# Patient Record
Sex: FEMALE | Race: WHITE | Employment: OTHER | URBAN - METROPOLITAN AREA
[De-identification: names, ages, dates, MRNs, and addresses within clinical notes are randomized per-mention and may not be internally consistent; named-entity substitution may affect disease eponyms.]

---

## 2017-10-19 ENCOUNTER — HOSPITAL ENCOUNTER (OUTPATIENT)
Dept: ULTRASOUND IMAGING | Age: 72
Discharge: HOME | End: 2017-10-19
Payer: MEDICARE

## 2017-10-19 DIAGNOSIS — Z12.39 BREAST CANCER SCREENING: ICD-10-CM

## 2017-10-19 DIAGNOSIS — Z98.82 HX OF BILATERAL BREAST IMPLANTS: ICD-10-CM

## 2017-10-19 DIAGNOSIS — R92.8 ABNORMAL MAMMOGRAM: ICD-10-CM

## 2017-10-19 PROCEDURE — 76641 ULTRASOUND BREAST COMPLETE: CPT

## 2018-08-24 ENCOUNTER — OFFICE VISIT (OUTPATIENT)
Dept: OBGYN CLINIC | Age: 73
End: 2018-08-24
Payer: MEDICARE

## 2018-08-24 VITALS
WEIGHT: 126 LBS | HEIGHT: 64 IN | BODY MASS INDEX: 21.51 KG/M2 | SYSTOLIC BLOOD PRESSURE: 124 MMHG | DIASTOLIC BLOOD PRESSURE: 62 MMHG

## 2018-08-24 DIAGNOSIS — Z01.419 WELL FEMALE EXAM WITH ROUTINE GYNECOLOGICAL EXAM: Primary | ICD-10-CM

## 2018-08-24 DIAGNOSIS — Z12.31 SCREENING MAMMOGRAM, ENCOUNTER FOR: ICD-10-CM

## 2018-08-24 DIAGNOSIS — Z11.51 SPECIAL SCREENING EXAMINATION FOR HUMAN PAPILLOMAVIRUS (HPV): ICD-10-CM

## 2018-08-24 DIAGNOSIS — Z78.0 POSTMENOPAUSAL: ICD-10-CM

## 2018-08-24 PROCEDURE — G0101 CA SCREEN;PELVIC/BREAST EXAM: HCPCS | Performed by: OBSTETRICS & GYNECOLOGY

## 2018-08-24 ASSESSMENT — ENCOUNTER SYMPTOMS
ABDOMINAL DISTENTION: 0
BLOOD IN STOOL: 0
ANAL BLEEDING: 0
DIARRHEA: 0
NAUSEA: 0
CONSTIPATION: 0
EYES NEGATIVE: 1
ALLERGIC/IMMUNOLOGIC NEGATIVE: 1
RECTAL PAIN: 0
VOMITING: 0
RESPIRATORY NEGATIVE: 1
ABDOMINAL PAIN: 0

## 2018-08-24 ASSESSMENT — PATIENT HEALTH QUESTIONNAIRE - PHQ9
SUM OF ALL RESPONSES TO PHQ QUESTIONS 1-9: 0
SUM OF ALL RESPONSES TO PHQ QUESTIONS 1-9: 0
2. FEELING DOWN, DEPRESSED OR HOPELESS: 0
1. LITTLE INTEREST OR PLEASURE IN DOING THINGS: 0
SUM OF ALL RESPONSES TO PHQ9 QUESTIONS 1 & 2: 0

## 2018-08-24 NOTE — PROGRESS NOTES
Subjective:      Patient ID: Gabriela Issa is a 68 y.o. female    Annual exam.  No PMB. No GYN complaints. Pap done per request.   Anel Thor breast care through breast surgeon at [facility]. Currently only has US secondary to small leak left implant. Discussed and patient with see if she can get MRI pre-authorized through breast surgeon's office for better assessment of breasts. Monthly SBE encouraged. Dexa scan ordered per protocol. Screening colonoscopy recommended per routine. F/U annual exam or prn. Vitals:  /62   Ht 5' 4\" (1.626 m)   Wt 126 lb (57.2 kg)   BMI 21.63 kg/m²   Past Medical History:   Diagnosis Date    Arthritis     Heart murmur      Past Surgical History:   Procedure Laterality Date    BREAST SURGERY      Implants    COLONOSCOPY N/A 10/19/2016    COLONOSCOPY performed by Neal Marinelli MD at [place] [id number] 10/19/2016    EGD ESOPHAGOGASTRODUODENOSCOPY performed by Neal Marinelli MD at [facility]     Allergies:  Sulfa antibiotics  Current Outpatient Prescriptions   Medication Sig Dispense Refill    vitamin D 1000 UNITS CAPS Take 2,000 Units by mouth       No current facility-administered medications for this visit. Social History     Social History    Marital status:      Spouse name: N/A    Number of children: N/A    Years of education: N/A     Occupational History    Not on file.      Social History Main Topics    Smoking status: Former Smoker    Smokeless tobacco: Never Used    Alcohol use No    Drug use: No    Sexual activity: Not Currently     Partners: Male     Other Topics Concern    Not on file     Social History Narrative    No narrative on file     Family History   Problem Relation Age of Onset    Heart Disease Mother     Colon Cancer Father     No Known Problems Sister     No Known Problems Sister     No Known Problems Sister     Lung Cancer Sister     No Known Problems Paternal

## 2018-08-24 NOTE — PROGRESS NOTES
The patient was asked if she would like a chaperone present for her intimate exam. She  declines the chaperone.  Mc

## 2018-08-28 ENCOUNTER — TELEPHONE (OUTPATIENT)
Dept: OBGYN CLINIC | Age: 73
End: 2018-08-28

## 2018-08-31 ENCOUNTER — HOSPITAL ENCOUNTER (OUTPATIENT)
Dept: ULTRASOUND IMAGING | Age: 73
Discharge: HOME | End: 2018-09-02
Payer: MEDICARE

## 2018-08-31 DIAGNOSIS — T85.43XD: ICD-10-CM

## 2018-08-31 PROCEDURE — 76641 ULTRASOUND BREAST COMPLETE: CPT

## 2018-09-05 DIAGNOSIS — Z11.51 SPECIAL SCREENING EXAMINATION FOR HUMAN PAPILLOMAVIRUS (HPV): ICD-10-CM

## 2018-09-05 DIAGNOSIS — Z01.419 WELL FEMALE EXAM WITH ROUTINE GYNECOLOGICAL EXAM: ICD-10-CM

## 2020-08-20 ENCOUNTER — HOSPITAL ENCOUNTER (OUTPATIENT)
Dept: ULTRASOUND IMAGING | Age: 75
Discharge: HOME | End: 2020-08-22
Payer: MEDICARE

## 2020-08-20 PROCEDURE — 76641 ULTRASOUND BREAST COMPLETE: CPT

## 2021-10-22 ENCOUNTER — HOSPITAL ENCOUNTER (OUTPATIENT)
Dept: ULTRASOUND IMAGING | Age: 76
Discharge: HOME | End: 2021-10-24
Payer: MEDICARE

## 2021-10-22 DIAGNOSIS — R92.8 ABNORMAL MAMMOGRAM: ICD-10-CM

## 2021-10-22 PROCEDURE — 76641 ULTRASOUND BREAST COMPLETE: CPT

## 2023-05-12 ENCOUNTER — TRANSCRIBE ORDERS (OUTPATIENT)
Dept: ADMINISTRATIVE | Age: 78
End: 2023-05-12

## 2023-05-12 DIAGNOSIS — R92.8 ABNORMAL MAMMOGRAM: Primary | ICD-10-CM

## 2023-05-12 DIAGNOSIS — T85.43XD: Primary | ICD-10-CM

## 2023-05-23 ENCOUNTER — HOSPITAL ENCOUNTER (OUTPATIENT)
Dept: ULTRASOUND IMAGING | Age: 78
Discharge: HOME | End: 2023-05-25
Payer: MEDICARE

## 2023-05-23 DIAGNOSIS — R92.8 ABNORMAL MAMMOGRAM: ICD-10-CM

## 2023-05-23 PROCEDURE — 76641 ULTRASOUND BREAST COMPLETE: CPT

## 2023-11-03 PROBLEM — I35.1 MILD AORTIC REGURGITATION: Status: ACTIVE | Noted: 2019-06-12

## 2023-11-03 PROBLEM — K21.9 GASTROESOPHAGEAL REFLUX DISEASE: Status: ACTIVE | Noted: 2021-05-13

## 2023-11-03 RX ORDER — VIT C/E/ZN/COPPR/LUTEIN/ZEAXAN 250MG-90MG
2000 CAPSULE ORAL
COMMUNITY
Start: 2016-09-09

## 2023-11-03 RX ORDER — HYDROXYZINE PAMOATE 25 MG/1
25 CAPSULE ORAL EVERY 8 HOURS PRN
COMMUNITY
Start: 2022-05-09 | End: 2023-11-10

## 2023-11-03 RX ORDER — SODIUM CHLORIDE 0.9 % (FLUSH) 0.9 %
10 SYRINGE (ML) INJECTION
COMMUNITY
Start: 2023-04-18 | End: 2023-11-10

## 2023-11-03 RX ORDER — DICLOFENAC SODIUM 10 MG/G
2 GEL TOPICAL 4 TIMES DAILY
COMMUNITY
Start: 2022-05-09 | End: 2023-11-10

## 2023-11-03 RX ORDER — FLUTICASONE PROPIONATE 50 MCG
2 SPRAY, SUSPENSION (ML) NASAL
COMMUNITY
Start: 2021-05-13 | End: 2023-11-10

## 2023-11-03 RX ORDER — AMLODIPINE BESYLATE 2.5 MG/1
2.5 TABLET ORAL
COMMUNITY
Start: 2023-10-17 | End: 2024-10-16

## 2023-11-03 RX ORDER — MULTIVITAMIN
TABLET ORAL
COMMUNITY
Start: 2004-02-26

## 2023-11-03 RX ORDER — PREDNISONE 10 MG/1
TABLET ORAL
COMMUNITY
Start: 2022-10-14 | End: 2023-11-10

## 2023-11-10 ENCOUNTER — OFFICE VISIT (OUTPATIENT)
Dept: CARDIOLOGY | Facility: CLINIC | Age: 78
End: 2023-11-10
Payer: MEDICARE

## 2023-11-10 VITALS — HEART RATE: 78 BPM | SYSTOLIC BLOOD PRESSURE: 132 MMHG | DIASTOLIC BLOOD PRESSURE: 69 MMHG | WEIGHT: 124 LBS

## 2023-11-10 DIAGNOSIS — I35.1 MILD AORTIC REGURGITATION: ICD-10-CM

## 2023-11-10 DIAGNOSIS — I35.1 MODERATE AORTIC REGURGITATION: ICD-10-CM

## 2023-11-10 DIAGNOSIS — I35.9 AORTIC VALVE DISORDER: Primary | ICD-10-CM

## 2023-11-10 PROBLEM — I10 PRIMARY HYPERTENSION: Status: ACTIVE | Noted: 2023-11-10

## 2023-11-10 PROCEDURE — 99203 OFFICE O/P NEW LOW 30 MIN: CPT | Performed by: INTERNAL MEDICINE

## 2023-11-10 PROCEDURE — 93000 ELECTROCARDIOGRAM COMPLETE: CPT | Performed by: INTERNAL MEDICINE

## 2023-11-10 PROCEDURE — 3078F DIAST BP <80 MM HG: CPT | Performed by: INTERNAL MEDICINE

## 2023-11-10 PROCEDURE — 3075F SYST BP GE 130 - 139MM HG: CPT | Performed by: INTERNAL MEDICINE

## 2023-11-10 PROCEDURE — 1159F MED LIST DOCD IN RCRD: CPT | Performed by: INTERNAL MEDICINE

## 2023-11-10 RX ORDER — AMLODIPINE BESYLATE 2.5 MG/1
2.5 TABLET ORAL
Qty: 90 TABLET | Refills: 3 | Status: CANCELLED | OUTPATIENT
Start: 2023-11-10 | End: 2024-11-09

## 2023-11-10 ASSESSMENT — ENCOUNTER SYMPTOMS
DEPRESSION: 0
LOSS OF SENSATION IN FEET: 0
OCCASIONAL FEELINGS OF UNSTEADINESS: 0

## 2023-11-10 NOTE — PROGRESS NOTES
CARDIOLOGY NEW PATIENT OFFICE VISIT    Patient:  Cherry Avendaño  YOB: 1945  MRN: [id number]       History of Present Illness:     Cherry Avendaño is a 78 y.o. female who is being seen today at the request of Dr. Roque for evaluation of aortic regurgitation. She does not have any history of atherosclerotic heart disease.  She has a history of recently diagnosed primary hypertension for which she takes low-dose amlodipine.  No diabetes mellitus, hyperlipidemia, or tobacco use.  She has a history of stage IIIb chronic kidney disease.  She is followed on a regular basis by Dr. Ramesh.  She was noted on echocardiogram in 2006 to have normal LV systolic function and mild (1+) aortic regurgitation.  She has had a few echocardiograms over the past several years that showed similar findings.  Most recent echocardiogram on June 30, 2023 showed an estimated LV ejection fraction 65% with normal RV size and function.  There was 2+ aortic regurgitation and 1+ mitral regurgitation.      She generally has been feeling well.  She is without any specific symptoms.  She remains very active.  She previously worked as a manager for [employer].  She denies any chest pain or shortness breath.  She denies any orthopnea, PND, or increasing peripheral edema.  She denies any palpitations, lightheadedness, near-syncope, or syncope.  She denies any fever, chills, or cough.  She denies any nausea, vomiting, or diaphoresis.  She denies any hemoptysis, hematemesis, melena, or hematochezia.  Lab studies dated October 10, 2023 showed a hemoglobin 10.6 and hematocrit 34.0.  BUN 22 and creatinine 1.54.  Sodium 133 and potassium 4.7.  Cholesterol 186 with triglycerides 106, HDL 43, and .  Liver function studies were normal.  Vitamin D level 34.  Her EKG in the office today shows normal sinus rhythm and is a normal tracing.      Allergies:     Allergies   Allergen Reactions    Sulfa (Sulfonamide Antibiotics) Rash         Outpatient Medications:     Current Outpatient Medications   Medication Instructions    amLODIPine (NORVASC) 2.5 mg, oral, Daily RT    cholecalciferol (VITAMIN D-3) 2,000 Units, oral, Daily RT    multivitamin tablet Take one(1) tablet daily.    vit C-E-cupric-zinc-lutein (Preservision) 226-90-0.8-5 mg capsule capsule 1 capsule, oral, Daily RT        Past Medical History:     No past medical history on file.      Social History:     Social History     Tobacco Use    Smoking status: Never     Passive exposure: Never    Smokeless tobacco: Never       Family History:   No family history on file.    Review of Systems:     12 point review of systems completed and is negative other than that detailed above.       Objective:     Vitals:    11/10/23 1433   BP: 136/70   Pulse:        Wt Readings from Last 4 Encounters:   11/10/23 56.2 kg (124 lb)       Physical Examination:   GENERAL:  Well developed, well nourished, in no acute distress.  CHEST:  Symmetric and nontender.  NEURO/PSYCH:  Alert and oriented times three with approppriate behavior and responses.  NECK:  Supple, no JVD, no bruit.  LUNGS:  Clear to auscultation bilaterally, normal respiratory effort.  HEART:  Rate and rhythm regular with I/VI diastolic murmur LUSB, no gallop appreciated.        There are no rubs, clicks or heaves.  EXTREMITIES:  Warm with good color, no clubbing or cyanosis.  There is no edema noted.  PERIPHERAL VASCULAR:  Pulses present and equally palpable; 2+ throughout.      Lab:     HgBA1c:    Lab Results   Component Value Date    HGBA1C 5.7 (H) 06/30/2023       Problem List:     Patient Active Problem List   Diagnosis    Aortic valve disorder    Chronic depressive personality disorder    Gastroesophageal reflux disease    Mild aortic regurgitation    Other hyperlipidemia    Other specified anemias       Assessment:     Moderate aortic regurgitation.    Primary hypertension.     Stage IIIb chronic kidney disease.        Plan:     She will  continue on amlodipine 2.5 mg daily.    She will be scheduled for a follow-up echocardiogram in 1 year.    No other new cardiac recommendations at this time.

## 2024-06-04 ENCOUNTER — TRANSCRIBE ORDERS (OUTPATIENT)
Dept: GENERAL RADIOLOGY | Age: 79
End: 2024-06-04

## 2024-06-04 DIAGNOSIS — Z12.39 ENCOUNTER FOR BREAST CANCER SCREENING USING NON-MAMMOGRAM MODALITY: Primary | ICD-10-CM

## 2024-06-27 ENCOUNTER — APPOINTMENT (OUTPATIENT)
Dept: ULTRASOUND IMAGING | Age: 79
End: 2024-06-27
Payer: MEDICARE

## 2024-06-27 ENCOUNTER — HOSPITAL ENCOUNTER (OUTPATIENT)
Dept: ULTRASOUND IMAGING | Age: 79
Discharge: HOME | End: 2024-06-29
Payer: MEDICARE

## 2024-06-27 DIAGNOSIS — Z12.39 ENCOUNTER FOR BREAST CANCER SCREENING USING NON-MAMMOGRAM MODALITY: ICD-10-CM

## 2024-06-27 PROCEDURE — 76641 ULTRASOUND BREAST COMPLETE: CPT

## 2024-11-04 ENCOUNTER — ANCILLARY PROCEDURE (OUTPATIENT)
Dept: CARDIOLOGY | Facility: HOSPITAL | Age: 79
End: 2024-11-04
Payer: MEDICARE

## 2024-11-04 DIAGNOSIS — I35.1 MILD AORTIC REGURGITATION: ICD-10-CM

## 2024-11-04 PROCEDURE — 93306 TTE W/DOPPLER COMPLETE: CPT

## 2024-11-04 PROCEDURE — 93306 TTE W/DOPPLER COMPLETE: CPT | Performed by: INTERNAL MEDICINE

## 2024-11-05 ENCOUNTER — TELEPHONE (OUTPATIENT)
Dept: CARDIOLOGY | Facility: CLINIC | Age: 79
End: 2024-11-05
Payer: MEDICARE

## 2024-11-05 LAB
AORTIC VALVE MEAN GRADIENT: 6 MMHG
AORTIC VALVE PEAK VELOCITY: 1.57 M/S
AV PEAK GRADIENT: 10 MMHG
AVA (PEAK VEL): 2.44 CM2
AVA (VTI): 2.18 CM2
EJECTION FRACTION APICAL 4 CHAMBER: 50
EJECTION FRACTION: 60 %
LEFT VENTRICLE INTERNAL DIMENSION DIASTOLE: 3.82 CM (ref 3.5–6)
LEFT VENTRICULAR OUTFLOW TRACT DIAMETER: 1.9 CM
LV EJECTION FRACTION BIPLANE: 55 %
MITRAL VALVE E/A RATIO: 0.98
MITRAL VALVE E/E' RATIO: 10.8
RIGHT VENTRICLE PEAK SYSTOLIC PRESSURE: 34.4 MMHG

## 2024-11-05 NOTE — TELEPHONE ENCOUNTER
----- Message from Riley Watkins sent at 11/5/2024 11:48 AM EST -----  Echocardiogram reviewed and looks fine.  Similar to previous results.  Can discuss in detail at her upcoming office visit.  Thanks.

## 2024-11-14 ENCOUNTER — APPOINTMENT (OUTPATIENT)
Dept: CARDIOLOGY | Facility: CLINIC | Age: 79
End: 2024-11-14
Payer: MEDICARE

## 2024-11-15 ENCOUNTER — APPOINTMENT (OUTPATIENT)
Dept: CARDIOLOGY | Facility: CLINIC | Age: 79
End: 2024-11-15
Payer: MEDICARE

## 2024-11-19 ENCOUNTER — APPOINTMENT (OUTPATIENT)
Dept: CARDIOLOGY | Facility: CLINIC | Age: 79
End: 2024-11-19
Payer: MEDICARE

## 2024-11-29 ENCOUNTER — OFFICE VISIT (OUTPATIENT)
Dept: URGENT CARE | Age: 79
End: 2024-11-29
Payer: MEDICARE

## 2024-11-29 VITALS
OXYGEN SATURATION: 98 % | TEMPERATURE: 98.7 F | BODY MASS INDEX: 21 KG/M2 | DIASTOLIC BLOOD PRESSURE: 86 MMHG | HEART RATE: 92 BPM | RESPIRATION RATE: 20 BRPM | WEIGHT: 123 LBS | HEIGHT: 64 IN | SYSTOLIC BLOOD PRESSURE: 143 MMHG

## 2024-11-29 DIAGNOSIS — R05.9 COUGH, UNSPECIFIED TYPE: ICD-10-CM

## 2024-11-29 DIAGNOSIS — J02.9 SORE THROAT: ICD-10-CM

## 2024-11-29 DIAGNOSIS — J06.9 VIRAL UPPER RESPIRATORY ILLNESS: Primary | ICD-10-CM

## 2024-11-29 LAB
POC RAPID INFLUENZA A: NEGATIVE
POC RAPID INFLUENZA B: NEGATIVE
POC RAPID STREP: NEGATIVE
POC SARS-COV-2 AG BINAX: NORMAL

## 2024-11-29 RX ORDER — BENZONATATE 200 MG/1
200 CAPSULE ORAL 3 TIMES DAILY PRN
Qty: 21 CAPSULE | Refills: 0 | Status: SHIPPED | OUTPATIENT
Start: 2024-11-29 | End: 2024-12-06

## 2024-11-29 NOTE — PROGRESS NOTES
"Subjective   Patient ID: Cherry Avendaño is a 79 y.o. female. They present today with a chief complaint of Sore Throat (Painful swallow), Cough (Yellow sputum ), Fatigue, and Request For Covid Testing.    History of Present Illness    79-year-old patient presents to clinic with complaints of productive cough with associated fatigue, rhinorrhea, sore throat ongoing for the past 2 days.  Reports has tried over-the-counter treatments without relief.  Reports no sick contacts. Denies shortness of breath, chest pain, dizziness, sinus pain, fevers, chills, body aches, nausea, vomiting, abdominal pain, diarrhea.       Past Medical History  Allergies as of 11/29/2024 - Reviewed 11/10/2023   Allergen Reaction Noted    Sulfa (sulfonamide antibiotics) Rash and Unknown 03/10/2014       (Not in a hospital admission)       No past medical history on file.    No past surgical history on file.     reports that she has never smoked. She has never been exposed to tobacco smoke. She has never used smokeless tobacco.    Review of Systems    ROS negative with the exception as noted on HPI                               Objective    Vitals:    11/29/24 1132   BP: 143/86   Pulse: 92   Resp: 20   Temp: 37.1 °C (98.7 °F)   SpO2: 98%   Weight: 55.8 kg (123 lb)   Height: 1.626 m (5' 4\")     No LMP recorded.    Physical Exam  Constitutional:       Appearance: Normal appearance.   HENT:      Head: Normocephalic and atraumatic.      Right Ear: Tympanic membrane, ear canal and external ear normal.      Left Ear: Tympanic membrane, ear canal and external ear normal.      Nose: Mucosal edema and rhinorrhea present. Rhinorrhea is clear.      Right Sinus: No maxillary sinus tenderness or frontal sinus tenderness.      Left Sinus: No maxillary sinus tenderness or frontal sinus tenderness.      Mouth/Throat:      Lips: Pink.      Mouth: Mucous membranes are moist. No oral lesions.      Dentition: Normal dentition. No gingival swelling.      Tongue: No " lesions. Tongue does not deviate from midline.      Palate: No mass and lesions.      Pharynx: Posterior oropharyngeal erythema and postnasal drip present. No pharyngeal swelling or uvula swelling.   Cardiovascular:      Rate and Rhythm: Normal rate and regular rhythm.      Heart sounds: No murmur heard.  Pulmonary:      Effort: Pulmonary effort is normal. No respiratory distress.      Breath sounds: Normal breath sounds. No stridor. No wheezing, rhonchi or rales.   Lymphadenopathy:      Cervical: Cervical adenopathy present.   Neurological:      Mental Status: She is alert.         Procedures    Point of Care Test & Imaging Results from this visit  Results for orders placed or performed in visit on 11/29/24   POCT rapid strep A manually resulted   Result Value Ref Range    POC Rapid Strep Negative Negative   POCT Covid-19 Rapid Antigen   Result Value Ref Range    POC BYRON-COV-2 AG  Presumptive negative test for SARS-CoV-2 (no antigen detected)     Presumptive negative test for SARS-CoV-2 (no antigen detected)   POCT Influenza A/B manually resulted   Result Value Ref Range    POC Rapid Influenza A Negative Negative    POC Rapid Influenza B Negative Negative      No results found.    Diagnostic study results (if any) were reviewed by Starr Barrow PA-C.    Assessment/Plan   Allergies, medications, history, and pertinent labs/EKGs/Imaging reviewed by Starr Barrow PA-C.   productive cough with associated fatigue, rhinorrhea, sore throat ongoing for the past 2 days.   Tessalon Perles started.   Discussed with patient likely viral in nature  in treatment is aimed at symptomatic care.   Patient asks if antibiotic would be helpful.  Discussed with patient antibiotics are not helpful with viral illnesses and generally can immunocompromise the patient is destroyed the good gut bacteria.   Patient expressed understanding. Advised to drink plenty of fluids, run a cool-mist humidifier in room at night, and get  plenty of rest. Pt. is advised to take 10 deep breaths and hours and continue to walk around every couple of hours. Patient should avoid over-exertion and reduce exposure to irritants such as smoke, cold, dry air, and dust. Patient may take acetaminophen or ibuprofen as directed to reduce fever and body aches. Antihistamine and decongestant usage was discussed and recommendations made. Risk, benefits, and potential side effects of medication(s) discussed with pt. Discussed disease/illness presentation, treatment options, progression, complications, and outcomes with patient. Pt. Has expressed understanding and is an agreement of plan of care.     Medical Decision Making      Orders and Diagnoses  Diagnoses and all orders for this visit:  Cough, unspecified type  -     POCT Covid-19 Rapid Antigen  -     POCT Influenza A/B manually resulted  Sore throat  -     POCT rapid strep A manually resulted      Medical Admin Record      Patient disposition: Home    Electronically signed by Starr Barrow PA-C  12:11 PM

## 2024-11-29 NOTE — PATIENT INSTRUCTIONS
Warm liquids with honey  Increase fluid intake; encourage electrolytes such as Pedialyte  10 deep breaths an hour  May use tylenol/NSAIDs as needed for fevers, chills, body aches

## 2024-12-13 ENCOUNTER — APPOINTMENT (OUTPATIENT)
Dept: CARDIOLOGY | Facility: CLINIC | Age: 79
End: 2024-12-13
Payer: MEDICARE

## 2025-04-04 ENCOUNTER — APPOINTMENT (OUTPATIENT)
Dept: CARDIOLOGY | Facility: CLINIC | Age: 80
End: 2025-04-04
Payer: MEDICARE

## 2025-04-04 VITALS
HEART RATE: 85 BPM | DIASTOLIC BLOOD PRESSURE: 58 MMHG | SYSTOLIC BLOOD PRESSURE: 110 MMHG | HEIGHT: 64 IN | WEIGHT: 121.8 LBS | BODY MASS INDEX: 20.79 KG/M2

## 2025-04-04 DIAGNOSIS — I35.1 MILD AORTIC REGURGITATION: ICD-10-CM

## 2025-04-04 DIAGNOSIS — I83.812 VARICOSE VEINS OF LEG WITH PAIN, LEFT: ICD-10-CM

## 2025-04-04 DIAGNOSIS — E78.49 OTHER HYPERLIPIDEMIA: ICD-10-CM

## 2025-04-04 DIAGNOSIS — I10 PRIMARY HYPERTENSION: ICD-10-CM

## 2025-04-04 PROCEDURE — 3074F SYST BP LT 130 MM HG: CPT | Performed by: INTERNAL MEDICINE

## 2025-04-04 PROCEDURE — 99213 OFFICE O/P EST LOW 20 MIN: CPT | Performed by: INTERNAL MEDICINE

## 2025-04-04 PROCEDURE — 3078F DIAST BP <80 MM HG: CPT | Performed by: INTERNAL MEDICINE

## 2025-04-04 PROCEDURE — 1159F MED LIST DOCD IN RCRD: CPT | Performed by: INTERNAL MEDICINE

## 2025-04-04 PROCEDURE — 1036F TOBACCO NON-USER: CPT | Performed by: INTERNAL MEDICINE

## 2025-04-04 NOTE — PROGRESS NOTES
Patient:  Cherry Avendaño  YOB: 1945  MRN: [id number]       Chief Complaint:      No chief complaint on file.      HPI:       Cherry Avendaño is a 79 y.o. female who returns today for cardiac follow-up.  She was seen on November 10, 2023 for evaluation of aortic regurgitation.  She does not have any history of atherosclerotic heart disease.  She has a history of primary hypertension for which she takes low-dose amlodipine.  No diabetes mellitus, hyperlipidemia, or tobacco use.  She has a history of stage IIIb chronic kidney disease.  She is followed on a regular basis by Dr. Ramesh.  She was noted on echocardiogram in 2006 to have normal LV systolic function and mild (1+) aortic regurgitation.  She has had a few echocardiograms over the past several years which showed similar findings.  An echocardiogram on June 30, 2023 showed an estimated LV ejection fraction 65% with normal RV size and function.  There was 2+ aortic regurgitation and 1+ mitral regurgitation.       She has been doing well since her last visit.  She is without any specific symptoms.  She remains very active.  She previously worked as a manager for [employer].  She denies any chest pain or shortness breath.  She denies any orthopnea, PND, or increasing peripheral edema.  She denies any palpitations, lightheadedness, near-syncope, or syncope.  She denies any fever, chills, or cough.  She denies any nausea, vomiting, or diaphoresis.  She denies any hemoptysis, hematemesis, melena, or hematochezia.  Lab studies dated October 10, 2023 showed a hemoglobin 10.6 and hematocrit 34.0.  BUN 22 and creatinine 1.54.  Sodium 133 and potassium 4.7.  Cholesterol 186 with triglycerides 106, HDL 43, and .  Liver function studies were normal.  Vitamin D level 34.  Her EKG in the office today shows normal sinus rhythm and is a normal tracing.    A follow-up echocardiogram November 4, 2024 showed an estimated LV ejection fraction of  60%.  Normal right ventricular chamber size and function.  Mild (1-2+) aortic regurgitation.  Estimated RVSP 36 mmHg.  Lab studies October 10, 2024 showed hemoglobin 10.8 and hematocrit 34.0.  LDL level in June 2022 was 138.  Basic metabolic profile was normal with exception creatinine 1.53.  She follows regular with Dr. Ramesh.  She will continue on her same medication.  Follow-up echocardiogram in approximately 2 to 3 years.  Other details noted below.    The above portion of this note was dictated by me using voice recognition software.  I personally performed the services described in the documentation.  The scribe entering the documentation below was in my presence.  I affirm that the information is both accurate and complete.      Objective:     Vitals:    04/04/25 1405   BP: 140/58   Pulse: 85       Wt Readings from Last 4 Encounters:   11/29/24 55.8 kg (123 lb)   11/10/23 56.2 kg (124 lb)       Allergies:     Allergies   Allergen Reactions    Sulfa (Sulfonamide Antibiotics) Rash and Unknown        Medications:     Current Outpatient Medications   Medication Instructions    amLODIPine (NORVASC) 2.5 mg, oral, Daily RT    cholecalciferol (VITAMIN D-3) 2,000 Units, oral, Daily RT    multivitamin tablet Take one(1) tablet daily.    vit C-E-cupric-zinc-lutein (Preservision) 226-90-0.8-5 mg capsule capsule 1 capsule, oral, Daily RT       Physical Examination:   GENERAL:  Well developed, well nourished, in no acute distress.  CHEST:  Symmetric and nontender.  NEURO/PSYCH:  Alert and oriented times three with approppriate behavior and responses.  NECK:  Supple, no JVD, no bruit.  LUNGS:  Clear to auscultation bilaterally, normal respiratory effort.  HEART:  Rate and rhythm regular with II/VI BRIDGET LSB and diastolic murmur LUSB, no gallop appreciated.        There are no rubs, clicks or heaves.  EXTREMITIES:  Warm with good color, no clubbing or cyanosis.  There is no edema noted.  PERIPHERAL VASCULAR:  Pulses present  and equally palpable; 2+ throughout.        Problem List:     Patient Active Problem List   Diagnosis    Aortic valve disorder    Chronic depressive personality disorder    Gastroesophageal reflux disease    Moderate aortic regurgitation    Other hyperlipidemia    Other specified anemias    Primary hypertension       Asessment:     Problem List Items Addressed This Visit             ICD-10-CM    Mild aortic regurgitation I35.1    Relevant Orders    Follow Up In Cardiology    Other hyperlipidemia E78.49    Relevant Orders    Follow Up In Cardiology    Primary hypertension I10    Relevant Orders    Follow Up In Cardiology    Varicose veins of leg with pain, left I83.812    Relevant Orders    Follow Up In Cardiology    Referral to Vascular Medicine       I, Valerie Keys RN  am scribing for, and in the presence of Dr. Eduardo Watkins MD, FACC.    I, Dr. Eduardo Watkins MD, FACC, personally performed the services described in the documentation as scribed by Valerie Keys RN  in my presence, and confirm it is both accurate and complete.      Provider Attestation - Scribe documentation    The above portion of this note was dictated by me using voice recognition software.  All medical record entries made by the scribe were at my direction.  The scribe entering the documentation was in my presence.   I have reviewed the chart and agree that the record accurately reflects my personal performance of the history, physical exam, discussion and plan.

## 2025-10-23 ENCOUNTER — APPOINTMENT (OUTPATIENT)
Dept: CARDIOLOGY | Facility: CLINIC | Age: 80
End: 2025-10-23
Payer: MEDICARE

## 2026-04-09 ENCOUNTER — APPOINTMENT (OUTPATIENT)
Dept: CARDIOLOGY | Facility: CLINIC | Age: 81
End: 2026-04-09
Payer: MEDICARE